# Patient Record
Sex: FEMALE | Race: WHITE | Employment: OTHER | URBAN - NONMETROPOLITAN AREA
[De-identification: names, ages, dates, MRNs, and addresses within clinical notes are randomized per-mention and may not be internally consistent; named-entity substitution may affect disease eponyms.]

---

## 2017-10-05 ENCOUNTER — TELEPHONE (OUTPATIENT)
Dept: PAIN MANAGEMENT | Age: 82
End: 2017-10-05

## 2017-10-05 NOTE — TELEPHONE ENCOUNTER
I have reviewed  Today's phone call report. Patient is scheduled for Kyphoplasty  Next Wdnesday  3 ))  At [facility]. Please obtain authorization from prescribing physician to hold Eliquis   Starting today or tomorrow. Will  Administer antibiotics at procedure. If patient is febrile on day of procedure, may have to hold procedure.

## 2017-10-06 NOTE — TELEPHONE ENCOUNTER
Called and left message for patient to let her know when she is scheduled for Kyphoplasty and to stop her Eliquis tomorrow

## 2017-10-11 ENCOUNTER — PROCEDURE VISIT (OUTPATIENT)
Dept: PAIN MANAGEMENT | Age: 82
End: 2017-10-11
Payer: MEDICARE

## 2017-10-11 DIAGNOSIS — M54.50 ACUTE BILATERAL LOW BACK PAIN WITHOUT SCIATICA: ICD-10-CM

## 2017-10-11 PROCEDURE — 22514 PERQ VERTEBRAL AUGMENTATION: CPT | Performed by: PHYSICAL MEDICINE & REHABILITATION

## 2017-10-11 PROCEDURE — 22515 PERQ VERTEBRAL AUGMENTATION: CPT | Performed by: PHYSICAL MEDICINE & REHABILITATION

## 2017-10-17 NOTE — PROGRESS NOTES
KYPHOPLASTY OPERATIVE REPORT    Surgeon: Jeff Rosas MD    10/11/17  Cristiano Saravia 1935  Pre-operative Diagnosis: There is no problem list on file for this patient. Post-operative Diagnosis: Same    INDICATION:  Cristiano Saravia has a history of osteoporosis and is not on osteoporosis medication. L2, L3L2,L3 balloon kyphoplasty is requested for therapeutic reasons. Please see H&P for details on previous treatments, examination findings, and work up. Conservative treatment was ineffective i.e.: ice, NSAIDS, rest, narcotic medication, chiropractic care, physical therapy and message therapy. Patient is unable to perform the following ADL's: ambulating and grooming                         Last Plain films: 2017    EXAMINATION:  L2,L3 balloon kyphoplasty    CONSENT: Written consent was obtained from the patient on the preprinted consent form after explaining the procedure, indications, potential complications and outcomes.  Alternative treatments were also discussed. DISCUSSION: The patient was sterilely prepped and draped in the usual fashion in the prone position. Time out was verified for correct patient, side, level and procedure.      SEDATION: Patient was given general anesthesia during the procedure by  The CRNA. The patient was fairly sedated throughout the procedure and underwent constant continuous EKG, pulse oximetry and blood pressure monitoring independently by the CRNA/RN. SEDATION TIME:  59 minutes. PROCEDURE TIME:  60 minutes.     PROCEDURE:  Using the image intensifier, the Bilateral L2,L3 pedicles were identified in AP and lateral views, A 1 cm paramedian incision was made next to the skin marker.  A transpedicular, extrapedicular, en face approach was used. The osteo introducer was then inserted and advanced to the pedicle taking and AP and lateral views were used to confirm trajectory, depth, and placement utilizing 2 C-arms.  The osteo introducer was then positioned 0.5cm just past the posterior vertebral body wall. The procedure was repeated on the Bilateral side at the same level. The inner cannula was removed and a drill was inserted into the vertebral body under fluoroscopic guidance creating a channel toward the anterior cortex.  Bone biopsy was taken.      After completing entry into the vertebral body, an inflatable balloon tamp was inserted into the inner cannula bilaterally and advanced under fluoroscopic guidance into the vertebral body. Radiopaque markers on the bone tamp were identified using AP and lateral images.  The bone tamp was then inflated to a max volume of 2 of contrast and a PSI of 121. The inflation was monitored with AP and lateral imaging. Direct reduction of the fracture was achieved with 100% of the height restored.  The bone tamp was deflated and removed.     Using fluoroscopic imaging and the use of the bone void fillers, internal fixation was achieved through the low pressure injection of Kyphon Xpede bone cement using the Kyphon Xpander cement delivery system.   A total of 3 mL of cement was filled from the left side and 3 mL from the right side.  Once the bone cement had hardened, the cannulas were removed. Post procedure, all incisions were closed with Steri-Strips and covered with gauze and Tegaderm to create a pressure dressing. The patient tolerated the procedure(s) well and without complications and was noted to be in stable condition prior to discharge from procedure center with discharge instructions. EBL: no blood loss    SPECIMEN: vertebral body L3. IMPRESSIONS:  1. L2,L3 Balloon Kyphoplasty accomplished without incident  2. L2,L3 Balloon Kyphoplasty   acute pain. RECOMMENDATIONS :  1. Follow up with Dr. Geovanna Watson MD in the pain clinic in 2-4 weeks. 2. Complete and return Post-Procedure Pain and Activity Diary.   3. Contact me for symptom exacerbation, fever or unusual symptoms.   4. Post-procedure care according to verbal and written instructions at discharge. 5. Osteoporosis medication is strongly recommended, if not already initiated, for reduction in risk of additional osteoporotic fractures.      POST-PROCEDURE KYPHOPLASTY INTERPRETATION:    EXAMINATION: AP, lateral views. FLUOROSCOPY TIME: 183 seconds    DISCUSSION: Spot views of the spine reveal normal segmentation. Osteointroducers are positioned in the BL2,L3 pedicle bilaterally.  Contrast in the bone tamp creates a cavity in the L2,L3  vertebral body.  The cement fills the cavity created by the balloon bone tamp. Extravasation of the cement was notseen.  Fracture reduction is estimated to be 100% .   Soft tissues reveal no abnormality. IMPRESSION: L2,L3 Balloon Kyphoplasty reveals satisfactory contrast and cement fill and fracture reduction.     Electronically signed by José Miguel Forde MD on 10/17/2017 at 9:17 AM

## 2017-11-22 ENCOUNTER — PROCEDURE VISIT (OUTPATIENT)
Dept: PAIN MANAGEMENT | Age: 82
End: 2017-11-22
Payer: MEDICARE

## 2017-11-22 DIAGNOSIS — M81.0 AGE RELATED OSTEOPOROSIS, UNSPECIFIED PATHOLOGICAL FRACTURE PRESENCE: ICD-10-CM

## 2017-11-22 PROCEDURE — 22514 PERQ VERTEBRAL AUGMENTATION: CPT | Performed by: PHYSICAL MEDICINE & REHABILITATION

## 2017-11-22 NOTE — PROGRESS NOTES
KYPHOPLASTY OPERATIVE REPORT    Surgeon: Jeff Rosas MD    11/22/17    Pre-operative Diagnosis: There is no problem list on file for this patient. Post-operative Diagnosis: Same    INDICATION:  Cristiano Saravia has a history of osteoporosis and is  on osteoporosis medication. L4na balloon kyphoplasty is requested for therapeutic reasons. Please see H&P for details on previous treatments, examination findings, and work up. Conservative treatment was ineffective i.e.: ice, NSAIDS, rest, narcotic medication, chiropractic care, physical therapy and message therapy. Patient is unable to perform the following ADL's: grooming                         Last Plain films: 11/13/17    EXAMINATION:  L4 balloon kyphoplasty    CONSENT: Written consent was obtained from the patient on the preprinted consent form after explaining the procedure, indications, potential complications and outcomes.  Alternative treatments were also discussed. DISCUSSION: The patient was sterilely prepped and draped in the usual fashion in the prone position. Time out was verified for correct patient, side, level and procedure.      SEDATION: Patient was given general anesthesia during the procedure by  The CRNA. The patient was fairly sedated throughout the procedure and underwent constant continuous EKG, pulse oximetry and blood pressure monitoring independently by the CRNA/RN. SEDATION TIME:  70 minutes. PROCEDURE TIME:  70 minutes.     PROCEDURE:  Using the image intensifier, the Bilateral L4 pedicles were identified in AP and lateral views, A 1 cm paramedian incision was made next to the skin marker.  A transpedicular, extrapedicular, en face approach was used. The osteo introducer was then inserted and advanced to the pedicle taking and AP and lateral views were used to confirm trajectory, depth, and placement utilizing 2 C-arms.  The osteo introducer was then positioned 0.5cm just past the posterior vertebral body wall. The discharge. 5. Osteoporosis medication is strongly recommended, if not already initiated, for reduction in risk of additional osteoporotic fractures.      POST-PROCEDURE KYPHOPLASTY INTERPRETATION:    EXAMINATION: AP, lateral views. FLUOROSCOPY TIME: 162 seconds    DISCUSSION: Spot views of the spine reveal normal segmentation. Osteointroducers are positioned in the L4 pedicle bilaterally.  Contrast in the bone tamp creates a cavity in the L4  vertebral body.  The cement fills the cavity created by the balloon bone tamp. Extravasation of the cement was notseen.  Fracture reduction is estimated to be 100% .   Soft tissues reveal no abnormality. IMPRESSION: L4 Balloon Kyphoplasty reveals satisfactory contrast and cement fill and fracture reduction.     Electronically signed by Abdi Sousa MD on 11/22/2017 at 5:13 PM